# Patient Record
Sex: MALE | Race: WHITE | NOT HISPANIC OR LATINO | URBAN - METROPOLITAN AREA
[De-identification: names, ages, dates, MRNs, and addresses within clinical notes are randomized per-mention and may not be internally consistent; named-entity substitution may affect disease eponyms.]

---
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
## 2022-02-12 VITALS
BODY MASS INDEX: 23.43 KG/M2 | WEIGHT: 173 LBS | HEART RATE: 68 BPM | HEIGHT: 72 IN | SYSTOLIC BLOOD PRESSURE: 130 MMHG | TEMPERATURE: 98.1 F | DIASTOLIC BLOOD PRESSURE: 64 MMHG

## 2022-02-15 NOTE — PROGRESS NOTES
Chief Complaint    Recently on Amoxicillin (10 day) for sinus infection.  Sx returning after completed meds.  Sx pressure headaches.   Living in residence halls.  History of Present Illness      Three weeks ago diagnosed with sinus infection and given amoxicillin. Has sinus pressure. Symptoms improved some while taking medication. Has frontal pressure for past 5 days. This is first significant sinus infection. Has a little post nasal drip. Has had a cold for past 2 months which is now resolved.  Review of Systems      No fevers       No vomiting       No tooth pain  Physical Exam   Vitals & Measurements    T: 98.1   F (Temporal Artery)  HR: 68(Peripheral)  BP: 130/64     HT: 72.25 in  WT: 173 lb  BMI: 23.3       General: No acute distress.      HENT: Tympanic membranes are clear, No pharyngeal erythema.      Neck: No lymphadenopathy.      Respiratory: Lungs are clear to auscultation.      Cardiovascular: Normal rate, Regular rhythm.      Musculoskeletal: Normal gait.  Assessment/Plan   Sinusitis: Start with NETI pot, Sudafed and chlorpheniramine. Start Augmentin and follow-up if not improving  Patient Information     Name:BARBARA HOLLINGSWORTH      Address:      [address] [place] [id number]     Sex:Male     YOB: 2000     [phone]     Emergency Contact:MARIA DE JESUS HOLLINGSWORTH     MRN:[id number]     FIN:[id number]     Location:[facility]     Date of Service:12/10/2018      Primary Care Physician:       NONE ,       Attending Physician:       Jason Olguin MD, [phone]  Problem List/Past Medical History    Ongoing     No qualifying data    Historical     No qualifying data  Medications        No Recorded Medications  Allergies   No active allergies  Social History    Smoking Status - 12/10/2018     Never smoker